# Patient Record
Sex: FEMALE | Race: WHITE | Employment: OTHER | URBAN - METROPOLITAN AREA
[De-identification: names, ages, dates, MRNs, and addresses within clinical notes are randomized per-mention and may not be internally consistent; named-entity substitution may affect disease eponyms.]

---

## 2017-05-22 ENCOUNTER — HOSPITAL ENCOUNTER (OUTPATIENT)
Dept: LAB | Age: 27
Discharge: HOME | End: 2017-05-22
Payer: OTHER GOVERNMENT

## 2017-05-22 ENCOUNTER — OFFICE VISIT (OUTPATIENT)
Dept: OBGYN CLINIC | Age: 27
End: 2017-05-22

## 2017-05-22 VITALS
WEIGHT: 140 LBS | HEIGHT: 64 IN | SYSTOLIC BLOOD PRESSURE: 103 MMHG | BODY MASS INDEX: 23.9 KG/M2 | DIASTOLIC BLOOD PRESSURE: 66 MMHG | HEART RATE: 69 BPM

## 2017-05-22 DIAGNOSIS — Z01.419 WELL WOMAN EXAM WITH ROUTINE GYNECOLOGICAL EXAM: Primary | ICD-10-CM

## 2017-05-22 DIAGNOSIS — Z30.09 FAMILY PLANNING EDUCATION, GUIDANCE, AND COUNSELING: ICD-10-CM

## 2017-05-22 PROCEDURE — 88175 CYTOPATH C/V AUTO FLUID REDO: CPT | Performed by: OBSTETRICS & GYNECOLOGY

## 2017-05-22 RX ORDER — ACETAMINOPHEN AND CODEINE PHOSPHATE 120; 12 MG/5ML; MG/5ML
1 SOLUTION ORAL DAILY
Qty: 1 PACKAGE | Refills: 11 | Status: SHIPPED | OUTPATIENT
Start: 2017-05-22

## 2017-05-22 NOTE — PROGRESS NOTES
Subjective:   28 y.o. female for Well Woman Check. She has  No GYN complaints, but states her 3year old is nursing. She is concerned about contraception and does not desire conception at this point. Her  is currently deployed. Patient's last menstrual period was 02/01/2017 (approximate). Social History: not sexually active. Pertinent past medical hstory: no history of HTN, DVT, CAD, DM, liver disease, migraines or smoking. Patient Active Problem List   Diagnosis Code    Migraine without aura and without status migrainosus, not intractable G43.009    Varicose veins during pregnancy, antepartum O22.00    Palpitations R00.2    Rh negative status during pregnancy in third trimester O09.893     Current Outpatient Prescriptions   Medication Sig Dispense Refill    norethindrone (MICRONOR) 0.35 mg tab Take 1 Tab by mouth daily. Indications: Pregnancy Contraception 1 Package 11    ibuprofen (MOTRIN) 800 mg tablet Take 1 Tab by mouth every eight (8) hours as needed. Indications: PAIN 30 Tab 0    pnv w/o calcium-iron fum-fa 27-1 mg tab Take  by mouth. No Known Allergies  Past Medical History:   Diagnosis Date    15 weeks gestation of pregnancy 11/23/2015    Migraine without aura and without status migrainosus, not intractable 11/23/2015    Rh negative status during pregnancy in third trimester 3/23/2016    Supervision of other normal pregnancy, antepartum 4/6/2016    Varicose veins during pregnancy, antepartum     on lower extremities     Past Surgical History:   Procedure Laterality Date    HX ORTHOPAEDIC  age 10    cyst removed left middle finger    HX TONSILLECTOMY  age 10     Family History   Problem Relation Age of Onset    No Known Problems Mother     Hypertension Father     No Known Problems Maternal Grandmother     No Known Problems Maternal Grandfather         ROS:  Feeling well. No dyspnea or chest pain on exertion.   No abdominal pain, change in bowel habits, black or bloody stools. No urinary tract symptoms. GYN ROS: no breast pain or new or enlarging lumps on self exam, she complains of irregular menses- has had 3 cycles total in 3 years. . No neurological complaints. Objective:     Visit Vitals    /66    Pulse 69    Ht 5' 4\" (1.626 m)    Wt 140 lb (63.5 kg)    LMP 02/01/2017 (Approximate)    Breastfeeding Yes    BMI 24.03 kg/m2     The patient appears well, alert, oriented x 3, in no distress. ENT normal.  Neck supple. No adenopathy or thyromegaly. HO. Lungs are clear, good air entry, no wheezes, rhonchi or rales. S1 and S2 normal, no murmurs, regular rate and rhythm. Abdomen soft without tenderness, guarding, mass or organomegaly. Extremities show no edema, normal peripheral pulses. Neurological is normal, no focal findings. BREAST EXAM: breasts appear normal, no suspicious masses, no skin or nipple changes or axillary nodes    PELVIC EXAM: normal external genitalia, vulva, vagina, cervix, uterus and adnexa    Assessment/Plan:   well woman- breastfeeding. Counseled about all contraceptive options. no contraindication to begin use of oral contraceptives  pap smear  counseled on breast self exam, use and side effects of OCP's and family planning choices  return annually or prn    ICD-10-CM ICD-9-CM    1. Well woman exam with routine gynecological exam Z01.419 V72.31 PAP IG, RFX HPV ASCU, 93&22,70([id number])   2. Family planning education, guidance, and counseling Z30.09 V25.09 norethindrone (MICRONOR) 0.35 mg tab   .

## 2017-05-22 NOTE — PATIENT INSTRUCTIONS
Norethindrone (By mouth)   Norethindrone (nor-ETH-in-drone)  Prevents pregnancy. Also treats menstrual problems and endometriosis. This medicine is commonly called a birth control pill. Brand Name(s): Nargis Fishman Errin, [facility], [place], [facility], Jefry, Nor-QD, Sejal-BE, Norlyroc, Ortho Micronor, Sharobel   There may be other brand names for this medicine. When This Medicine Should Not Be Used: This medicine is not right for everyone. Do not use it if you had an allergic reaction to a progestin drug or if you are pregnant. Do not use it if you have liver disease, liver tumors, or a history of blood clots, stroke, heart attack, or breast cancer. How to Use This Medicine:   Tablet  · Your doctor will tell you how much medicine to use. Do not use more than directed. Different brands of birth control pills have different instructions for when to start. Ask your doctor or pharmacist if you do not understand what day to start taking your brand. · You may take this medicine with food or milk if it upsets your stomach. · Keep your pills in the container you receive from the pharmacy. Take the pills in the order they appear in the container. · Read and follow the patient instructions that come with this medicine. Talk to your doctor or pharmacist if you have any questions. · Take your pill at the same time every day, even during your menstrual period. · Take a dose as soon as you remember. If it is almost time for your next dose, wait until then and take a regular dose. Do not take extra medicine to make up for a missed dose. If you take a pill more than 3 hours late, use another form of birth control for the next 48 hours. · Store the pills in the original package, away from heat, moisture, and direct light. Drugs and Foods to Avoid:   Ask your doctor or pharmacist before using any other medicine, including over-the-counter medicines, vitamins, and herbal products.   · Some foods and medicines can affect how birth control pills work. Tell your doctor if you are also taking any of the following:  ¨ Bromocriptine, rifampin, Nakita's wort, bosentan, griseofulvin  ¨ Antibiotic  ¨ Seizure medicine, including phenytoin, carbamazepine, felbamate, topiramate  ¨ Protease inhibitor that treats HIV/AIDS  ¨ Barbiturate (used to treat seizures, anxiety, or insomnia)  Warnings While Using This Medicine:   · Tell your doctor right away if you think you have become pregnant. If you miss two periods in a row, call your doctor for a pregnancy test before you take any more pills. · Tell your doctor if you are breastfeeding or if you have kidney disease, lupus, high blood pressure, high cholesterol, epilepsy, asthma, migraine headaches, diabetes, or a history of depression. · This medicine may cause the following problems:  ¨ Ectopic (tubal) pregnancy  ¨ Ovarian cysts that might twist or break  ¨ Possible risk of breast cancer  ¨ Benign liver tumor  ¨ Blood clots, which may lead to stroke or heart attack  · You might have spotting or irregular bleeding when you first start to use this medicine. You might have unplanned bleeding if you miss a dose or are late taking it. Call your doctor if you think there is a problem, such as if you have heavy bleeding. · This medicine will not protect you from HIV/AIDS or other sexually transmitted diseases. · Use a second form of birth control during the first 3 weeks to make sure you are protected from pregnancy. · Smoking increases your risk of heart attack, stroke, or blood clot while using this medicine. Talk to your doctor about these risks. · Tell any doctor or dentist who treats you that you are using this medicine. This medicine may affect certain medical test results. · Keep all medicine out of the reach of children. Never share your medicine with anyone.   Possible Side Effects While Using This Medicine:   Call your doctor right away if you notice any of these side effects:  · Allergic reaction: Itching or hives, swelling in your face or hands, swelling or tingling in your mouth or throat, chest tightness, trouble breathing  · Chest pain, trouble breathing, or coughing up blood  · Numbness or weakness on one side of your body, sudden or severe headache, problems with vision, speech, or walking  · Pain in your lower abdomen  · Severe headache, sensitivity to light or sound, nausea or vomiting  · Trouble seeing, double vision, or other eye problems  · Yellow skin or eyes  If you notice these less serious side effects, talk with your doctor:   · Breast tenderness or swelling  · Headache  · Light spotting or bleeding between periods  · Nausea  If you notice other side effects that you think are caused by this medicine, tell your doctor. Call your doctor for medical advice about side effects. You may report side effects to FDA at 0-135-FDA-7488  © 2017 [facility] Information is for End User's use only and may not be sold, redistributed or otherwise used for commercial purposes. The above information is an  only. It is not intended as medical advice for individual conditions or treatments. Talk to your doctor, nurse or pharmacist before following any medical regimen to see if it is safe and effective for you.

## 2017-05-22 NOTE — MR AVS SNAPSHOT
Visit Information Date & Time Provider Department Dept. Phone Encounter #  
 5[facility]22/2017  1[phone] [id number] Upcoming Health Maintenance Date Due  
 PAP AKA CERVICAL CYTOLOGY 2/15/2015 INFLUENZA AGE 9 TO ADULT 8/1/2017 Allergies as of 5/22/2017  Review Complete On: 5/22/2017 By: Arabella Joseph MD  
 No Known Allergies Current Immunizations  Reviewed on 5/22/2016 Name Date Influenza Vaccine 10/26/2015 12:10 PM  
 Rho(D) Immune Globulin - IM 5/22/2016  3:58 AM  
 Tdap 3/9/2016 10:00 AM  
  
 Not reviewed this visit You Were Diagnosed With   
  
 Codes Comments Well woman exam with routine gynecological exam    -  Primary ICD-10-CM: P05.170 ICD-9-CM: V72.31 Family planning education, guidance, and counseling     ICD-10-CM: Z30.09 
ICD-9-CM: V25.09 Vitals BP Pulse Height(growth percentile) Weight(growth percentile) LMP Breastfeeding? 103/66 69 5' 4\" (1.626 m) 140 lb (63.5 kg) 02/01/2017 (Approximate) Yes  
 BMI OB Status Smoking Status 24.03 kg/m2 Postpartum Former Smoker BMI and BSA Data Body Mass Index Body Surface Area 24.03 kg/m 2 1.69 m 2 Preferred Pharmacy Pharmacy Name Phone [facility] [address], [address] [place] [address] [phone] Your Updated Medication List  
  
   
This list is accurate as of: 5/22/17  1:57 PM.  Always use your most recent med list.  
  
  
  
  
 ibuprofen 800 mg tablet Commonly known as:  MOTRIN Take 1 Tab by mouth every eight (8) hours as needed. Indications: PAIN  
  
 pnv w/o calcium-iron fum-fa 27-1 mg Tab Take  by mouth. Introducing \[facility]\"" & HEALTH SERVICES! Dear Early Romaine: 
Thank you for requesting a [employer] account. Our records indicate that you already have an active [employer] account. You can access your account anytime at [email or web address] Did you know that you can access your hospital and ER discharge instructions at any time in [employer]? You can also review all of your test results from your hospital stay or ER visit. Additional Information If you have questions, please visit the Frequently Asked Questions section of the [employer] website at [email or web address] Remember, [employer] is NOT to be used for urgent needs. For medical emergencies, dial 911. Now available from your iPhone and Android! Please provide this summary of care documentation to your next provider. Your primary care clinician is listed as Edie Heck. If you have any questions after today's visit, please call [phone].

## 2018-06-27 ENCOUNTER — HOSPITAL ENCOUNTER (EMERGENCY)
Age: 28
Discharge: HOME | End: 2018-06-28
Attending: EMERGENCY MEDICINE
Payer: OTHER GOVERNMENT

## 2018-06-27 ENCOUNTER — APPOINTMENT (OUTPATIENT)
Dept: CT IMAGING | Age: 28
End: 2018-06-27
Attending: EMERGENCY MEDICINE
Payer: OTHER GOVERNMENT

## 2018-06-27 DIAGNOSIS — R10.31 ACUTE ABDOMINAL PAIN IN RIGHT LOWER QUADRANT: Primary | ICD-10-CM

## 2018-06-27 LAB
ALBUMIN SERPL-MCNC: 4.1 G/DL (ref 3.4–5)
ALBUMIN/GLOB SERPL: 1.2 {RATIO} (ref 0.8–1.7)
ALP SERPL-CCNC: 40 U/L (ref 45–117)
ALT SERPL-CCNC: 17 U/L (ref 13–56)
ANION GAP SERPL CALC-SCNC: 7 MMOL/L (ref 3–18)
APPEARANCE UR: CLEAR
AST SERPL-CCNC: 13 U/L (ref 15–37)
BASOPHILS # BLD: 0 K/UL (ref 0–0.06)
BASOPHILS NFR BLD: 0 % (ref 0–2)
BILIRUB SERPL-MCNC: 0.6 MG/DL (ref 0.2–1)
BILIRUB UR QL: NEGATIVE
BUN SERPL-MCNC: 11 MG/DL (ref 7–18)
BUN/CREAT SERPL: 17 (ref 12–20)
CALCIUM SERPL-MCNC: 9.8 MG/DL (ref 8.5–10.1)
CHLORIDE SERPL-SCNC: 107 MMOL/L (ref 100–108)
CO2 SERPL-SCNC: 27 MMOL/L (ref 21–32)
COLOR UR: YELLOW
CREAT SERPL-MCNC: 0.64 MG/DL (ref 0.6–1.3)
DIFFERENTIAL METHOD BLD: ABNORMAL
EOSINOPHIL # BLD: 0 K/UL (ref 0–0.4)
EOSINOPHIL NFR BLD: 0 % (ref 0–5)
ERYTHROCYTE [DISTWIDTH] IN BLOOD BY AUTOMATED COUNT: 12.5 % (ref 11.6–14.5)
GLOBULIN SER CALC-MCNC: 3.5 G/DL (ref 2–4)
GLUCOSE SERPL-MCNC: 98 MG/DL (ref 74–99)
GLUCOSE UR STRIP.AUTO-MCNC: NEGATIVE MG/DL
HCG UR QL: NEGATIVE
HCT VFR BLD AUTO: 40 % (ref 35–45)
HGB BLD-MCNC: 14 G/DL (ref 12–16)
HGB UR QL STRIP: NEGATIVE
KETONES UR QL STRIP.AUTO: NEGATIVE MG/DL
LEUKOCYTE ESTERASE UR QL STRIP.AUTO: NEGATIVE
LYMPHOCYTES # BLD: 2.8 K/UL (ref 0.9–3.6)
LYMPHOCYTES NFR BLD: 20 % (ref 21–52)
MCH RBC QN AUTO: 31.8 PG (ref 24–34)
MCHC RBC AUTO-ENTMCNC: 35 G/DL (ref 31–37)
MCV RBC AUTO: 90.9 FL (ref 74–97)
MONOCYTES # BLD: 1.5 K/UL (ref 0.05–1.2)
MONOCYTES NFR BLD: 11 % (ref 3–10)
NEUTS SEG # BLD: 10.1 K/UL (ref 1.8–8)
NEUTS SEG NFR BLD: 69 % (ref 40–73)
NITRITE UR QL STRIP.AUTO: NEGATIVE
PH UR STRIP: 6.5 [PH] (ref 5–8)
PLATELET # BLD AUTO: 228 K/UL (ref 135–420)
PMV BLD AUTO: 10.6 FL (ref 9.2–11.8)
POTASSIUM SERPL-SCNC: 3.8 MMOL/L (ref 3.5–5.5)
PROT SERPL-MCNC: 7.6 G/DL (ref 6.4–8.2)
PROT UR STRIP-MCNC: NEGATIVE MG/DL
RBC # BLD AUTO: 4.4 M/UL (ref 4.2–5.3)
SODIUM SERPL-SCNC: 141 MMOL/L (ref 136–145)
SP GR UR REFRACTOMETRY: 1.02 (ref 1–1.03)
UROBILINOGEN UR QL STRIP.AUTO: 0.2 EU/DL (ref 0.2–1)
WBC # BLD AUTO: 14.5 K/UL (ref 4.6–13.2)

## 2018-06-27 PROCEDURE — 81003 URINALYSIS AUTO W/O SCOPE: CPT | Performed by: EMERGENCY MEDICINE

## 2018-06-27 PROCEDURE — 80053 COMPREHEN METABOLIC PANEL: CPT | Performed by: EMERGENCY MEDICINE

## 2018-06-27 PROCEDURE — 74011250636 HC RX REV CODE- 250/636: Performed by: EMERGENCY MEDICINE

## 2018-06-27 PROCEDURE — 74177 CT ABD & PELVIS W/CONTRAST: CPT

## 2018-06-27 PROCEDURE — 81025 URINE PREGNANCY TEST: CPT | Performed by: EMERGENCY MEDICINE

## 2018-06-27 PROCEDURE — 74011636320 HC RX REV CODE- 636/320: Performed by: EMERGENCY MEDICINE

## 2018-06-27 PROCEDURE — 99283 EMERGENCY DEPT VISIT LOW MDM: CPT

## 2018-06-27 PROCEDURE — 85025 COMPLETE CBC W/AUTO DIFF WBC: CPT | Performed by: EMERGENCY MEDICINE

## 2018-06-27 PROCEDURE — 96374 THER/PROPH/DIAG INJ IV PUSH: CPT

## 2018-06-27 RX ORDER — KETOROLAC TROMETHAMINE 30 MG/ML
30 INJECTION, SOLUTION INTRAMUSCULAR; INTRAVENOUS
Status: COMPLETED | OUTPATIENT
Start: 2018-06-27 | End: 2018-06-27

## 2018-06-27 RX ADMIN — IOPAMIDOL 93 ML: 612 INJECTION, SOLUTION INTRAVENOUS at 23:10

## 2018-06-27 RX ADMIN — KETOROLAC TROMETHAMINE 30 MG: 30 INJECTION, SOLUTION INTRAMUSCULAR at 23:30

## 2018-06-28 ENCOUNTER — HOSPITAL ENCOUNTER (EMERGENCY)
Age: 28
Discharge: HOME | End: 2018-06-28
Attending: EMERGENCY MEDICINE | Admitting: SPECIALIST
Payer: OTHER GOVERNMENT

## 2018-06-28 VITALS
SYSTOLIC BLOOD PRESSURE: 106 MMHG | OXYGEN SATURATION: 100 % | HEART RATE: 74 BPM | BODY MASS INDEX: 24.24 KG/M2 | TEMPERATURE: 98.5 F | RESPIRATION RATE: 16 BRPM | WEIGHT: 142 LBS | DIASTOLIC BLOOD PRESSURE: 60 MMHG | HEIGHT: 64 IN

## 2018-06-28 VITALS
SYSTOLIC BLOOD PRESSURE: 115 MMHG | OXYGEN SATURATION: 100 % | TEMPERATURE: 98.3 F | HEART RATE: 89 BPM | DIASTOLIC BLOOD PRESSURE: 58 MMHG | RESPIRATION RATE: 12 BRPM

## 2018-06-28 DIAGNOSIS — R10.31 ABDOMINAL PAIN, RIGHT LOWER QUADRANT: Primary | ICD-10-CM

## 2018-06-28 LAB
ALBUMIN SERPL-MCNC: 4.3 G/DL (ref 3.4–5)
ALBUMIN/GLOB SERPL: 1.3 {RATIO} (ref 0.8–1.7)
ALP SERPL-CCNC: 42 U/L (ref 45–117)
ALT SERPL-CCNC: 18 U/L (ref 13–56)
ANION GAP SERPL CALC-SCNC: 8 MMOL/L (ref 3–18)
AST SERPL-CCNC: 14 U/L (ref 15–37)
BASOPHILS # BLD: 0 K/UL (ref 0–0.06)
BASOPHILS NFR BLD: 0 % (ref 0–2)
BILIRUB SERPL-MCNC: 0.9 MG/DL (ref 0.2–1)
BUN SERPL-MCNC: 11 MG/DL (ref 7–18)
BUN/CREAT SERPL: 17 (ref 12–20)
CALCIUM SERPL-MCNC: 9.3 MG/DL (ref 8.5–10.1)
CHLORIDE SERPL-SCNC: 107 MMOL/L (ref 100–108)
CO2 SERPL-SCNC: 25 MMOL/L (ref 21–32)
CREAT SERPL-MCNC: 0.63 MG/DL (ref 0.6–1.3)
DIFFERENTIAL METHOD BLD: ABNORMAL
EOSINOPHIL # BLD: 0 K/UL (ref 0–0.4)
EOSINOPHIL NFR BLD: 1 % (ref 0–5)
ERYTHROCYTE [DISTWIDTH] IN BLOOD BY AUTOMATED COUNT: 12.6 % (ref 11.6–14.5)
GLOBULIN SER CALC-MCNC: 3.2 G/DL (ref 2–4)
GLUCOSE SERPL-MCNC: 84 MG/DL (ref 74–99)
HCT VFR BLD AUTO: 39.2 % (ref 35–45)
HGB BLD-MCNC: 13.8 G/DL (ref 12–16)
LYMPHOCYTES # BLD: 2.9 K/UL (ref 0.9–3.6)
LYMPHOCYTES NFR BLD: 38 % (ref 21–52)
MCH RBC QN AUTO: 31.8 PG (ref 24–34)
MCHC RBC AUTO-ENTMCNC: 35.2 G/DL (ref 31–37)
MCV RBC AUTO: 90.3 FL (ref 74–97)
MONOCYTES # BLD: 0.9 K/UL (ref 0.05–1.2)
MONOCYTES NFR BLD: 11 % (ref 3–10)
NEUTS SEG # BLD: 3.8 K/UL (ref 1.8–8)
NEUTS SEG NFR BLD: 50 % (ref 40–73)
PLATELET # BLD AUTO: 205 K/UL (ref 135–420)
PMV BLD AUTO: 10.3 FL (ref 9.2–11.8)
POTASSIUM SERPL-SCNC: 4 MMOL/L (ref 3.5–5.5)
PROT SERPL-MCNC: 7.5 G/DL (ref 6.4–8.2)
RBC # BLD AUTO: 4.34 M/UL (ref 4.2–5.3)
SODIUM SERPL-SCNC: 140 MMOL/L (ref 136–145)
WBC # BLD AUTO: 7.7 K/UL (ref 4.6–13.2)

## 2018-06-28 PROCEDURE — 74011250636 HC RX REV CODE- 250/636: Performed by: EMERGENCY MEDICINE

## 2018-06-28 PROCEDURE — 99283 EMERGENCY DEPT VISIT LOW MDM: CPT

## 2018-06-28 PROCEDURE — 74011000258 HC RX REV CODE- 258: Performed by: EMERGENCY MEDICINE

## 2018-06-28 PROCEDURE — 96365 THER/PROPH/DIAG IV INF INIT: CPT

## 2018-06-28 PROCEDURE — 74011250636 HC RX REV CODE- 250/636

## 2018-06-28 PROCEDURE — 80053 COMPREHEN METABOLIC PANEL: CPT | Performed by: EMERGENCY MEDICINE

## 2018-06-28 PROCEDURE — 85025 COMPLETE CBC W/AUTO DIFF WBC: CPT | Performed by: EMERGENCY MEDICINE

## 2018-06-28 RX ORDER — ACETAMINOPHEN 500 MG
1000 TABLET ORAL
Qty: 30 TAB | Refills: 0 | Status: SHIPPED | OUTPATIENT
Start: 2018-06-28

## 2018-06-28 RX ORDER — IBUPROFEN 800 MG/1
800 TABLET ORAL
Qty: 30 TAB | Refills: 0 | Status: SHIPPED | OUTPATIENT
Start: 2018-06-28

## 2018-06-28 RX ADMIN — PIPERACILLIN SODIUM,TAZOBACTAM SODIUM 3.38 G: 3; .375 INJECTION, POWDER, FOR SOLUTION INTRAVENOUS at 18:59

## 2018-06-28 NOTE — ED PROVIDER NOTES
HPI Comments: Jessie Kevin is a 33 y.o. Female with c/o rlq abd pain that started last night got worse today then better tonight. Sharp, worse with palpation eating. No diarrhea, urinary sx, fever. Nothing taken. No sig abd gyn issues. No vag bleeding. The history is provided by the patient. Past Medical History:   Diagnosis Date    15 weeks gestation of pregnancy 11/23/2015    Migraine without aura and without status migrainosus, not intractable 11/23/2015    Rh negative status during pregnancy in third trimester 3/23/2016    Supervision of other normal pregnancy, antepartum 4/6/2016    Varicose veins during pregnancy, antepartum     on lower extremities       Past Surgical History:   Procedure Laterality Date    HX ORTHOPAEDIC  age 10    cyst removed left middle finger    HX TONSILLECTOMY  age 10         Family History:   Problem Relation Age of Onset    No Known Problems Mother     Hypertension Father     No Known Problems Maternal Grandmother     No Known Problems Maternal Grandfather        Social History     Social History    Marital status:      Spouse name: N/A    Number of children: N/A    Years of education: N/A     Occupational History    Not on file. Social History Main Topics    Smoking status: Former Smoker    Smokeless tobacco: Not on file    Alcohol use No    Drug use: No      Comment: denies    Sexual activity: Yes     Partners: Male     Birth control/ protection: None     Other Topics Concern    Not on file     Social History Narrative         ALLERGIES: Review of patient's allergies indicates no known allergies. Review of Systems   Constitutional: Positive for appetite change. Negative for fever. HENT: Negative for sore throat and trouble swallowing. Eyes: Negative for visual disturbance. Respiratory: Negative for cough and shortness of breath. Cardiovascular: Negative for chest pain. Gastrointestinal: Positive for abdominal pain.  Negative for blood in stool. Endocrine: Negative for polyuria. Genitourinary: Negative for difficulty urinating and hematuria. Musculoskeletal: Negative for gait problem. Skin: Negative for rash. Allergic/Immunologic: Negative for immunocompromised state. Neurological: Negative for syncope. Psychiatric/Behavioral: Positive for sleep disturbance. Vitals:    06/27/18 2053 06/27/18 2110 06/28/18 0011   BP: 118/63 114/67 115/58   Pulse: 84 71 89   Resp: 16 14 12   Temp: 98.3 °F (36.8 °C)     SpO2: 100% 100%             Physical Exam   Constitutional: She is oriented to person, place, and time. She appears well-developed and well-nourished. Non-toxic appearance. She does not have a sickly appearance. She does not appear ill. No distress. HENT:   Head: Normocephalic and atraumatic. Right Ear: External ear normal.   Left Ear: External ear normal.   Nose: Nose normal.   Mouth/Throat: Uvula is midline, oropharynx is clear and moist and mucous membranes are normal.   Eyes: Conjunctivae are normal. No scleral icterus. Neck: Neck supple. Cardiovascular: Normal rate, regular rhythm, normal heart sounds and intact distal pulses. Pulmonary/Chest: Effort normal and breath sounds normal.   Abdominal: Soft. Normal appearance. There is no hepatosplenomegaly. There is tenderness. There is guarding and tenderness at McBurney's point. There is no rebound, no CVA tenderness and negative Dodd's sign. Musculoskeletal: She exhibits no edema. Neurological: She is alert and oriented to person, place, and time. Gait normal.   Skin: Skin is warm and dry. She is not diaphoretic. Psychiatric: Her behavior is normal.   Nursing note and vitals reviewed.        [facility]      ED Course       Procedures  Vitals:  Patient Vitals for the past 12 hrs:   Temp Pulse Resp BP SpO2   06/28/18 0011 - 89 12 115/58 -   06/27/18 2110 - 71 14 114/67 100 %   06/27/18 2053 98.3 °F (36.8 °C) 84 16 118/63 100 %         Medications ordered: Medications   ketorolac (TORADOL) injection 30 mg (30 mg IntraVENous Given 6/27/18 7810)   iopamidol (ISOVUE 300) 61 % contrast injection 100 mL (93 mL IntraVENous Given 6/27/18 2310)         Lab findings:  Recent Results (from the past 12 hour(s))   URINALYSIS W/ RFLX MICROSCOPIC    Collection Time: 06/27/18  9:03 PM   Result Value Ref Range    Color YELLOW      Appearance CLEAR      Specific gravity 1.022 1.005 - 1.030      pH (UA) 6.5 5.0 - 8.0      Protein NEGATIVE  NEG mg/dL    Glucose NEGATIVE  NEG mg/dL    Ketone NEGATIVE  NEG mg/dL    Bilirubin NEGATIVE  NEG      Blood NEGATIVE  NEG      Urobilinogen 0.2 0.2 - 1.0 EU/dL    Nitrites NEGATIVE  NEG      Leukocyte Esterase NEGATIVE  NEG     HCG URINE, QL    Collection Time: 06/27/18  9:03 PM   Result Value Ref Range    HCG urine, QL NEGATIVE  NEG     CBC WITH AUTOMATED DIFF    Collection Time: 06/27/18  9:04 PM   Result Value Ref Range    WBC 14.5 (H) 4.6 - 13.2 K/uL    RBC 4.40 4.20 - 5.30 M/uL    HGB 14.0 12.0 - 16.0 g/dL    HCT 40.0 35.0 - 45.0 %    MCV 90.9 74.0 - 97.0 FL    MCH 31.8 24.0 - 34.0 PG    MCHC 35.0 31.0 - 37.0 g/dL    RDW 12.5 11.6 - 14.5 %    PLATELET 832 288 - 537 K/uL    MPV 10.6 9.2 - 11.8 FL    NEUTROPHILS 69 40 - 73 %    LYMPHOCYTES 20 (L) 21 - 52 %    MONOCYTES 11 (H) 3 - 10 %    EOSINOPHILS 0 0 - 5 %    BASOPHILS 0 0 - 2 %    ABS. NEUTROPHILS 10.1 (H) 1.8 - 8.0 K/UL    ABS. LYMPHOCYTES 2.8 0.9 - 3.6 K/UL    ABS. MONOCYTES 1.5 (H) 0.05 - 1.2 K/UL    ABS. EOSINOPHILS 0.0 0.0 - 0.4 K/UL    ABS.  BASOPHILS 0.0 0.0 - 0.06 K/UL    DF AUTOMATED     METABOLIC PANEL, COMPREHENSIVE    Collection Time: 06/27/18  9:04 PM   Result Value Ref Range    Sodium 141 136 - 145 mmol/L    Potassium 3.8 3.5 - 5.5 mmol/L    Chloride 107 100 - 108 mmol/L    CO2 27 21 - 32 mmol/L    Anion gap 7 3.0 - 18 mmol/L    Glucose 98 74 - 99 mg/dL    BUN 11 7.0 - 18 MG/DL    Creatinine 0.64 0.6 - 1.3 MG/DL    BUN/Creatinine ratio 17 12 - 20      GFR est AA >60 >60 ml/min/1.73m2    GFR est non-AA >60 >60 ml/min/1.73m2    Calcium 9.8 8.5 - 10.1 MG/DL    Bilirubin, total 0.6 0.2 - 1.0 MG/DL    ALT (SGPT) 17 13 - 56 U/L    AST (SGOT) 13 (L) 15 - 37 U/L    Alk. phosphatase 40 (L) 45 - 117 U/L    Protein, total 7.6 6.4 - 8.2 g/dL    Albumin 4.1 3.4 - 5.0 g/dL    Globulin 3.5 2.0 - 4.0 g/dL    A-G Ratio 1.2 0.8 - 1.7         EKG interpretation by ED Physician:      X-Ray, CT or other radiology findings or impressions:  CT ABD PELV W CONT    (Results Pending)   appendix not clearly visualized. No sec stranding or other sigs of appendicits  Increased stool transverse colon    Progress notes, Consult notes or additional Procedure notes:   Given location of stool likley etiology of pain in upper abd. Doubt acute appy need for consult admission  I have discussed with patient and/or family/sig other the results, interpretation of any imaging if performed, suspected diagnosis and treatment plan to include instructions regarding the diagnoses listed to which understanding was expressed with all questions answered      Reevaluation of patient:   stable    Disposition:  Diagnosis:   1. Acute abdominal pain in right lower quadrant        Disposition: home      Follow-up Information     Follow up With Details Comments Contact [facility] EMERGENCY DEPT  If symptoms worsen [address] [place] [address]  [phone]            Patient's Medications   Start Taking    ACETAMINOPHEN (TYLENOL EXTRA STRENGTH) 500 MG TABLET    Take 2 Tabs by mouth every six (6) hours as needed for Pain. Continue Taking    NORETHINDRONE (MICRONOR) 0.35 MG TAB    Take 1 Tab by mouth daily. Indications: Pregnancy Contraception    PNV W/O CALCIUM-IRON FUM-FA 27-1 MG TAB    Take  by mouth. These Medications have changed    Modified Medication Previous Medication    IBUPROFEN (MOTRIN) 800 MG TABLET ibuprofen (MOTRIN) 800 mg tablet       Take 1 Tab by mouth every eight (8) hours as needed.  Indications: Pain Take 1 Tab by mouth every eight (8) hours as needed.  Indications: PAIN   Stop Taking    No medications on file

## 2018-06-28 NOTE — CALL BACK NOTE
I called patient to discuss findings of possible appendicitis per attending read this morning. I explained that she needs to come back to ED for repeat work up as she may need surgery consult. She will come as soon as she can and understands risks of not. States she is feeling better and agrees with plan to return.     Jania Joshi PA-C 10:06 AM

## 2018-06-28 NOTE — PROGRESS NOTES
Noted result. Dr. Regina Iglesias and VALDEZ Desai called patient and asked her to return to the ER.  Clau Ndiaye

## 2018-06-28 NOTE — ED NOTES
I performed a brief evaluation, including history and physical, of the patient here in triage and I have determined that pt will need further treatment and evaluation from the main side ER physician. I have placed initial orders to help in expediting patients care.      June 28, 2018 at 5:03 PM - VALDEZ Goss

## 2018-06-28 NOTE — ED TRIAGE NOTES
Lower right abdominal pain x 2 days. Also constipated. Took exlax w/ good resul;ts. CT from last night shows appy. Last ate at noon.  Seen yesterday and called to return

## 2018-06-28 NOTE — ED PROVIDER NOTES
EMERGENCY DEPARTMENT HISTORY AND PHYSICAL EXAM    5:10 PM      Date: 6/28/2018  Patient Name: Dawn Ramsey    History of Presenting Illness     Chief Complaint   Patient presents with    Abdominal Pain         History Provided By: Patient    Chief Complaint: Abdominal Pain  Duration:  1 day  Timing:  Worsening  Location: Lower right abdomen  Quality: N/A  Severity: mild  Modifying Factors: worse with walking up stairs  Associated Symptoms: Left flank pain and constipation. Denies vomiting, dysuria, vaginal bleeding, and vaginal discharge. Additional History (Context): Dawn Ramsey is a 33 y.o. female who presents with lower right abdominal pain that began 1 month ago and worsened last night. The patient was informed to come back to the ED to be reevaluated as there was an over read showing appendicitis on her CT from yesterday evening. The patient also reports having some left flank pain. The patient states she was constipated last night and had taken Ex-Lax today and noted some improvement in pain. She stil reports pain when walking up the stairs. She denies vomiting, dysuria, vaginal bleeding, and vaginal discharge. The last time the patient had anything to eat or drink was at 12:00pm today. The patient denies any abdominal surgeries. The patient is not currently on any medications. The patient's last menstrual period was 2 weeks ago. PCP: Maame Zhong, DO    Current Facility-Administered Medications   Medication Dose Route Frequency Provider Last Rate Last Dose    piperacillin-tazobactam (ZOSYN) 3.375 g in 0.9% sodium chloride (MBP/ADV) 100 mL MBP  3.375 g IntraVENous NOW Charmayne Mettle, DO         Current Outpatient Prescriptions   Medication Sig Dispense Refill    ibuprofen (MOTRIN) 800 mg tablet Take 1 Tab by mouth every eight (8) hours as needed. Indications: Pain 30 Tab 0    acetaminophen (TYLENOL EXTRA STRENGTH) 500 mg tablet Take 2 Tabs by mouth every six (6) hours as needed for Pain. 30 Tab 0    norethindrone (MICRONOR) 0.35 mg tab Take 1 Tab by mouth daily. Indications: Pregnancy Contraception 1 Package 11    pnv w/o calcium-iron fum-fa 27-1 mg tab Take  by mouth. Past History     Past Medical History:  Past Medical History:   Diagnosis Date    15 weeks gestation of pregnancy 11/23/2015    Migraine without aura and without status migrainosus, not intractable 11/23/2015    Rh negative status during pregnancy in third trimester 3/23/2016    Supervision of other normal pregnancy, antepartum 4/6/2016    Varicose veins during pregnancy, antepartum     on lower extremities       Past Surgical History:  Past Surgical History:   Procedure Laterality Date    HX ORTHOPAEDIC  age 10    cyst removed left middle finger    HX TONSILLECTOMY  age 10       Family History:  Family History   Problem Relation Age of Onset    No Known Problems Mother     Hypertension Father     No Known Problems Maternal Grandmother     No Known Problems Maternal Grandfather        Social History:  Social History   Substance Use Topics    Smoking status: Former Smoker    Smokeless tobacco: Never Used    Alcohol use No       Allergies:  No Known Allergies      Review of Systems       Review of Systems   Gastrointestinal: Positive for abdominal pain and constipation. Negative for vomiting. Genitourinary: Positive for flank pain. Negative for dysuria, vaginal bleeding and vaginal discharge. All other systems reviewed and are negative. Physical Exam     Visit Vitals    /66 (BP 1 Location: Right arm, BP Patient Position: At rest)    Pulse 90    Temp 98.3 °F (36.8 °C)    Resp 16    Ht 5' 4\" (1.626 m)    Wt 64.4 kg (142 lb)    LMP 06/08/2018    SpO2 100%    BMI 24.37 kg/m2       Physical Exam   Constitutional: She is oriented to person, place, and time. She appears well-developed and well-nourished. HENT:   Head: Normocephalic and atraumatic. Neck: Neck supple. No JVD present. Cardiovascular: Normal rate and regular rhythm. Pulmonary/Chest: Effort normal and breath sounds normal. No respiratory distress. Abdominal: Soft. She exhibits no distension. There is tenderness in the right lower quadrant. There is no rebound and no guarding. Musculoskeletal: She exhibits no edema. Neurological: She is alert and oriented to person, place, and time. Skin: Skin is warm and dry. No erythema. Psychiatric: Judgment normal.         Diagnostic Study Results     Labs -  Recent Results (from the past 12 hour(s))   CBC WITH AUTOMATED DIFF    Collection Time: 06/28/18  5:20 PM   Result Value Ref Range    WBC 7.7 4.6 - 13.2 K/uL    RBC 4.34 4.20 - 5.30 M/uL    HGB 13.8 12.0 - 16.0 g/dL    HCT 39.2 35.0 - 45.0 %    MCV 90.3 74.0 - 97.0 FL    MCH 31.8 24.0 - 34.0 PG    MCHC 35.2 31.0 - 37.0 g/dL    RDW 12.6 11.6 - 14.5 %    PLATELET 709 666 - 044 K/uL    MPV 10.3 9.2 - 11.8 FL    NEUTROPHILS 50 40 - 73 %    LYMPHOCYTES 38 21 - 52 %    MONOCYTES 11 (H) 3 - 10 %    EOSINOPHILS 1 0 - 5 %    BASOPHILS 0 0 - 2 %    ABS. NEUTROPHILS 3.8 1.8 - 8.0 K/UL    ABS. LYMPHOCYTES 2.9 0.9 - 3.6 K/UL    ABS. MONOCYTES 0.9 0.05 - 1.2 K/UL    ABS. EOSINOPHILS 0.0 0.0 - 0.4 K/UL    ABS. BASOPHILS 0.0 0.0 - 0.06 K/UL    DF AUTOMATED     METABOLIC PANEL, COMPREHENSIVE    Collection Time: 06/28/18  5:20 PM   Result Value Ref Range    Sodium 140 136 - 145 mmol/L    Potassium 4.0 3.5 - 5.5 mmol/L    Chloride 107 100 - 108 mmol/L    CO2 25 21 - 32 mmol/L    Anion gap 8 3.0 - 18 mmol/L    Glucose 84 74 - 99 mg/dL    BUN 11 7.0 - 18 MG/DL    Creatinine 0.63 0.6 - 1.3 MG/DL    BUN/Creatinine ratio 17 12 - 20      GFR est AA >60 >60 ml/min/1.73m2    GFR est non-AA >60 >60 ml/min/1.73m2    Calcium 9.3 8.5 - 10.1 MG/DL    Bilirubin, total 0.9 0.2 - 1.0 MG/DL    ALT (SGPT) 18 13 - 56 U/L    AST (SGOT) 14 (L) 15 - 37 U/L    Alk.  phosphatase 42 (L) 45 - 117 U/L    Protein, total 7.5 6.4 - 8.2 g/dL    Albumin 4.3 3.4 - 5.0 g/dL Globulin 3.2 2.0 - 4.0 g/dL    A-G Ratio 1.3 0.8 - 1.7         Radiologic Studies -   No orders to display         Medical Decision Making   I am the first provider for this patient. I reviewed the vital signs, available nursing notes, past medical history, past surgical history, family history and social history. Vital Signs-Reviewed the patient's vital signs. Pulse Oximetry Analysis -  100 on room air (Interpretation) nl       Records Reviewed: Nursing Notes, Old Medical Records and Previous Radiology Studies (Time of Review: 5:10 PM)    ED Course: Progress Notes, Reevaluation, and Consults:    Provider Notes (Medical Decision Making): 32 y/o female presents with RLQ abd pain. CT showing appendicitis. Pt well appearing at this time, no sign of perf. Will repeat labs and consult surgery. 6:08 PM  Consult: Discussed with Dr. Serge Kelly, Gen Surgery, he will consult on pt.       6:10 PM : Pt care transferred to Dr. Jr Rodriguez  ,ED provider. History of patient complaint(s), available diagnostic reports and current treatment plan has been discussed thoroughly. Bedside rounding on patient occured : yes . Intended disposition of patient : TBD  Pending diagnostics reports and/or labs (please list): awaiting surg consult      Diagnosis     Clinical Impression: No diagnosis found. Disposition:      Attestations:  Scribe Attestation     Parveen Stokes acting as a scribe for and in the presence of Juany Hewitt DO      June 28, 2018 at 6:09 PM       Provider Attestation:      I personally performed the services described in the documentation, reviewed the documentation, as recorded by the scribe in my presence, and it accurately and completely records my words and actions.  June 28, 2018 at 6:09 PM - Juany Hewitt DO    _______________________________

## 2018-06-28 NOTE — PROGRESS NOTES
7:09 PM :Pt care assumed from Dr. Mikey Donnelly , ED provider. Pt complaint(s), current treatment plan, progression and available diagnostic results have been discussed thoroughly. Rounding occurred: yes  Intended Disposition: TBD   Pending diagnostic reports and/or labs (please list): seen by Dr. Jc Mendez 1 day ago, has RLQ pain. CT showed acute appendicitis. WC of 14 yesterday, improved today. Has tenderness. Given zosyn. NPO since then. Pending surgery consult.     8:09 PM Dr. Srini Lynne consulted. Pt would like to go home. Patient reevaluated by myself, she is very well-appearing, abdomen is completely benign except for RIGHT lower quadrant where she only has pain on deep palpation. There is no low pelvic pain doubtfully adnexal process. She had used laxatives her pain had improved although slightly better. Evaluated in person by surgery, they had a discussion about risks benefits and alternatives of laparoscopic appendectomy and confirm that she did not want to have this done at this time. She is stable for outpatient management, understanding that she will return here immediately with any concerns whatsoever    Patient's presentation, history, physical exam and laboratory evaluations were reviewed. At this time patient was felt to be stable for outpatient management and follow with primary care/specialist.  Patient was instructed to return to the emergency department with any concerns. Disposition:    Discharged home      Portions of this chart were created with Dragon medical speech to text program.   Unrecognized errors may be present.       Disposition: Discharged    [address] Rolando acting as a scribe for and in the presence of Barbara Paredes MD      June 28, 2018 at 7:09 PM       Provider Attestation:      I personally performed the services described in the documentation, reviewed the documentation, as recorded by the scribe in my presence, and it accurately and completely records my words and actions.  June 28, 2018 at 7:09 PM - Laura France MD

## 2018-06-29 NOTE — CONSULTS
[address]  MR#: [id number]  : 1990  ACCOUNT #: [de-identified]   DATE OF SERVICE: 2018    HISTORY OF PRESENT ILLNESS:  The patient is a 24-year-old female who came to the emergency room late last night because of abdominal pain. She states that the pain was noticeable when she first woke up yesterday morning. The pain seemed to be in the mid to upper abdomen, and as the day progressed, the pain got worse. It seemed to move a little bit lower in the abdomen. She did have an episode of nausea and when the pain was persisting and fairly severe and associated with the nausea, she decided to come to the emergency room. At that time, the patient did not have a fever. She denied any dysuria. Her last menstrual period was just about 2-3 weeks ago and was normal.  She was not having fever or chills. A CT scan of the abdomen was done. That reading initially came back as no evidence of appendicitis. Patient apparently at that time was noted by the emergency room physician to have tenderness in the right lower quadrant with some guarding and tenderness at the McBurney point. A reevaluation showed that she was stable and apparently perhaps a little bit better, so she was discharged. Her white count when she came in yesterday was 14,500 with no shift. The patient was told that her CT scan did show a fair amount of stool in the colon and occasionally she has had constipation, so she took some Ex-Lax today and actually had several bowel movements. However, earlier today an overread by the radiologist stated that the CT scan did show what looked like perhaps early appendicitis with some slight thickening of the wall of the appendix and questionable stranding, but no evidence of any rupture, no free air. So the patient was told to come back to the emergency room. Presently, the patient states she has no nausea.   She actually ate some food and drank some water at lunch. She has had no dysuria, no fever or chills. She states that her pain is much better than it had been. She notices a little bit of discomfort when she climbs stairs. The pain is now just located in the right lower quadrant, but mostly only when the area is palpated. Overall, the patient does feel much better. The repeat white count this evening shows that her white count is 7700 with no left shift. Her urinalysis yesterday failed to show any evidence of a UTI. As mentioned, I personally reviewed the CT scan and discussed it with the radiologist.  The diameter of the appendix is 10 mm, sitting next to the uterus with no perforation, no abscess. No significant fluid. ALLERGIES:  NONE. MEDICATIONS AT HOME:  None except vitamins. PAST MEDICAL HISTORY:  No history of diabetes, hypertension, heart disease, stroke, kidney disease or cancer. PAST SURGICAL HISTORY:  Tonsillectomy. REVIEW OF SYSTEMS:  Patient denies any specific cardiac problems except for occasional palpitations. She denies any chronic respiratory, other gastrointestinal or genitourinary symptoms. No neurologic, hematologic or endocrinologic problems. She is  2. The patient's hCG was negative. I have nothing to add to the patient's social or family history. Her  is present with her. PHYSICAL EXAMINATION:    GENERAL:  The patient is lying on the stretcher and when I asked her to sit up, she sits without any evidence of any grimacing or discomfort. She is alert and cooperative. VITAL SIGNS:  She is afebrile, and her pulse is 74 with a blood pressure of 106/60. HEENT:  Normal.  NECK:  Normal without adenopathy. LUNGS:  Clear to auscultation and percussion. HEART:  Regular. No murmur or extra heart sounds heard. ABDOMEN:  Flat, soft, with no organomegaly or masses. She has normally active bowel sounds.   The patient has very mild tenderness in the right lower quadrant with fairly deep palpation without any guarding or rebound. She has slight tenderness or soreness when I move her right leg. ASSESSMENT:  Patient with right lower quadrant pain that has improved over the last 36 hours, as has her white count. A CT scan suggested a generous appendix with possible early appendicitis, but clinically the patient certainly has improved and this also after taking a laxative and clearing out the fair amount of stool that was in her colon. At the present time, clinically I doubt that the patient has appendicitis. We talked about her options for treatment, whether to proceed with surgery given the CT findings or continue observation. The patient was in favor of observation, which I am as well. I gave the patient the option of being here in the hospital or going home since she lives very close to the hospital.  She preferred to go home. I instructed her that if she got any increasing pain, fever, nausea, she should come on back to the emergency room. I did talk about the possibility that if this is an early appendicitis antibiotics may help, so we did go ahead and place her on amoxicillin 500 mg 3 times a day. Patient and  were satisfied with this plan, and I feel comfortable that they will come back if her symptoms worsen.       MD PATY Boateng /   D: 06/28/2018 20:21     T: 06/28/2018 22:12  JOB #: [id number]  CC: Raul Luke MD  [address] [facility] [id number]  [address] [facility]

## 2018-06-29 NOTE — DISCHARGE INSTRUCTIONS
Abdominal Pain: Care Instructions  Your Care Instructions    Abdominal pain has many possible causes. Some aren't serious and get better on their own in a few days. Others need more testing and treatment. If your pain continues or gets worse, you need to be rechecked and may need more tests to find out what is wrong. You may need surgery to correct the problem. Don't ignore new symptoms, such as fever, nausea and vomiting, urination problems, pain that gets worse, and dizziness. These may be signs of a more serious problem. Your doctor may have recommended a follow-up visit in the next 8 to 12 hours. If you are not getting better, you may need more tests or treatment. The doctor has checked you carefully, but problems can develop later. If you notice any problems or new symptoms, get medical treatment right away. Follow-up care is a key part of your treatment and safety. Be sure to make and go to all appointments, and call your doctor if you are having problems. It's also a good idea to know your test results and keep a list of the medicines you take. How can you care for yourself at home? · Rest until you feel better. · To prevent dehydration, drink plenty of fluids, enough so that your urine is light yellow or clear like water. Choose water and other caffeine-free clear liquids until you feel better. If you have kidney, heart, or liver disease and have to limit fluids, talk with your doctor before you increase the amount of fluids you drink. · If your stomach is upset, eat mild foods, such as rice, dry toast or crackers, bananas, and applesauce. Try eating several small meals instead of two or three large ones. · Wait until 48 hours after all symptoms have gone away before you have spicy foods, alcohol, and drinks that contain caffeine. · Do not eat foods that are high in fat. · Avoid anti-inflammatory medicines such as aspirin, ibuprofen (Advil, Motrin), and naproxen (Aleve).  These can cause stomach upset. Talk to your doctor if you take daily aspirin for another health problem. When should you call for help? Call 911 anytime you think you may need emergency care. For example, call if:  ? · You passed out (lost consciousness). ? · You pass maroon or very bloody stools. ? · You vomit blood or what looks like coffee grounds. ? · You have new, severe belly pain. ?Call your doctor now or seek immediate medical care if:  ? · Your pain gets worse, especially if it becomes focused in one area of your belly. ? · You have a new or higher fever. ? · Your stools are black and look like tar, or they have streaks of blood. ? · You have unexpected vaginal bleeding. ? · You have symptoms of a urinary tract infection. These may include:  ¨ Pain when you urinate. ¨ Urinating more often than usual.  ¨ Blood in your urine. ? · You are dizzy or lightheaded, or you feel like you may faint. ? Watch closely for changes in your health, and be sure to contact your doctor if:  ? · You are not getting better after 1 day (24 hours). Where can you learn more? Go to http://phillip-armin.info/. Enter O482 in the search box to learn more about \"Abdominal Pain: Care Instructions. \"  Current as of: March 20, 2017  Content Version: 11.4  © 4887-0436 [employer]. Care instructions adapted under license by [employer] (which disclaims liability or warranty for this information). If you have questions about a medical condition or this instruction, always ask your healthcare professional. [place] [address] any warranty or liability for your use of this information. Possible Appendicitis: Care Instructions  Your Care Instructions    Your doctor thinks you may have appendicitis. This means that your appendix may be infected. The appendix is a small sac that is shaped like a finger. It's attached to your large intestine.   Sometimes it's hard to tell if a person has appendicitis. It is especially hard to tell in children, pregnant women, and older adults. If your doctor thinks it's possible that you have appendicitis, he or she may want to order more tests. Or your doctor may want to wait to see if your symptoms change. Your doctor thinks it's okay for you to go home right now. But you will need to watch for symptoms of appendicitis at home. If your symptoms continue or get worse, it's important to call your doctor or get medical care right away. Appendicitis can get serious very quickly. The main treatment is surgery to remove your appendix. Follow-up care is a key part of your treatment and safety. Be sure to make and go to all appointments, and call your doctor if you are having problems. It's also a good idea to know your test results and keep a list of the medicines you take. How can you care for yourself at home? · Do not eat or drink, unless your doctor says it is okay. If you need surgery, it's best to have an empty stomach. If you're thirsty, you can rinse your mouth with water. Or you can suck on hard candy. · Do not take laxatives. If you have appendicitis, they can make the appendix burst.  · Follow your doctor's instructions about taking medicines. Your doctor may tell you not to take antibiotics or pain pills. These medicines can make it harder to tell if you have appendicitis. · Watch for symptoms of appendicitis. See the When should you call for help section below. It is very important to follow your doctor's instructions about getting treatment if you have these symptoms. When should you call for help? Call your doctor now or seek immediate medical care if:  ? · You have pain that becomes focused on one area of your belly. ? · You have new or worse belly pain. ? · You are vomiting. ? · You have a fever. ? · You cannot pass stools or gas. ? Watch closely for changes in your health, and be sure to contact your doctor if:  ? · You do not get better as expected. Where can you learn more? Go to http://phillip-armin.info/. Enter U493 in the search box to learn more about \"Possible Appendicitis: Care Instructions. \"  Current as of: May 12, 2017  Content Version: 11.4  © 4722-4394 [employer], [employer]. Care instructions adapted under license by [employer] (which disclaims liability or warranty for this information). If you have questions about a medical condition or this instruction, always ask your healthcare professional. [address] any warranty or liability for your use of this information.

## 2018-12-28 ENCOUNTER — DOCUMENTATION ONLY (OUTPATIENT)
Dept: OBGYN CLINIC | Age: 28
End: 2018-12-28

## 2018-12-28 NOTE — PROGRESS NOTES
Pt called with complaint of vaginal itching after using monistat and vagisil. Pt offered an appt 1-2-2019 but declined due to work schedule. Pt advised to apply topical monistat on area and to use scent free soap or plain water and to call when able to make an appt. Pt states she has an appt with PCP on 12.31.18 and will have it checked there. Pt verbal understanding.

## 2019-07-12 ENCOUNTER — OFFICE VISIT (OUTPATIENT)
Dept: OBGYN CLINIC | Age: 29
End: 2019-07-12

## 2019-07-12 ENCOUNTER — HOSPITAL ENCOUNTER (OUTPATIENT)
Dept: LAB | Age: 29
Discharge: HOME | End: 2019-07-12
Payer: OTHER GOVERNMENT

## 2019-07-12 VITALS
HEART RATE: 65 BPM | SYSTOLIC BLOOD PRESSURE: 112 MMHG | BODY MASS INDEX: 27.31 KG/M2 | WEIGHT: 160 LBS | DIASTOLIC BLOOD PRESSURE: 62 MMHG | HEIGHT: 64 IN | RESPIRATION RATE: 18 BRPM

## 2019-07-12 DIAGNOSIS — Z30.09 FAMILY PLANNING: ICD-10-CM

## 2019-07-12 DIAGNOSIS — Z01.419 ENCOUNTER FOR GYNECOLOGICAL EXAMINATION WITHOUT ABNORMAL FINDING: Primary | ICD-10-CM

## 2019-07-12 PROCEDURE — 88175 CYTOPATH C/V AUTO FLUID REDO: CPT

## 2019-07-12 PROCEDURE — 87624 HPV HI-RISK TYP POOLED RSLT: CPT

## 2019-07-12 RX ORDER — BISMUTH SUBSALICYLATE 262 MG
1 TABLET,CHEWABLE ORAL DAILY
COMMUNITY

## 2019-07-12 NOTE — PROGRESS NOTES
Subjective:   26 y.o. female for Well Woman Check. Patient's last menstrual period was 07/11/2019. Social History: single partner, contraception - none. Pertinent past medical hstory: no history of HTN, DVT, CAD, DM, liver disease, migraines or smoking. Current Outpatient Medications   Medication Sig Dispense Refill    multivitamin (ONE A DAY) tablet Take 1 Tab by mouth daily.  ibuprofen (MOTRIN) 800 mg tablet Take 1 Tab by mouth every eight (8) hours as needed. Indications: Pain 30 Tab 0    acetaminophen (TYLENOL EXTRA STRENGTH) 500 mg tablet Take 2 Tabs by mouth every six (6) hours as needed for Pain. 30 Tab 0    norethindrone (MICRONOR) 0.35 mg tab Take 1 Tab by mouth daily. Indications: Pregnancy Contraception 1 Package 11    pnv w/o calcium-iron fum-fa 27-1 mg tab Take  by mouth. No Known Allergies  Past Medical History:   Diagnosis Date    15 weeks gestation of pregnancy 11/23/2015    Migraine without aura and without status migrainosus, not intractable 11/23/2015    Rh negative status during pregnancy in third trimester 3/23/2016    Supervision of other normal pregnancy, antepartum 4/6/2016    Varicose veins during pregnancy, antepartum     on lower extremities     Past Surgical History:   Procedure Laterality Date    HX ORTHOPAEDIC  age 10    cyst removed left middle finger    HX TONSILLECTOMY  age 10     Family History   Problem Relation Age of Onset    No Known Problems Mother     Hypertension Father     No Known Problems Maternal Grandmother     No Known Problems Maternal Grandfather      Social History     Tobacco Use    Smoking status: Former Smoker    Smokeless tobacco: Never Used   Substance Use Topics    Alcohol use: No     Alcohol/week: 0.0 oz        ROS:  Feeling well. No dyspnea or chest pain on exertion. No abdominal pain, change in bowel habits, black or bloody stools. No urinary tract symptoms.  GYN ROS: normal menses, no abnormal bleeding, pelvic pain or discharge, no breast pain or new or enlarging lumps on self exam. No neurological complaints. Objective:     Visit Vitals  /62 (BP 1 Location: Left arm, BP Patient Position: Sitting)   Pulse 65   Resp 18   Ht 5' 4\" (1.626 m)   Wt 160 lb (72.6 kg)   LMP 07/11/2019   Breastfeeding? No   BMI 27.46 kg/m²     The patient appears well, alert, oriented x 3, in no distress. ENT normal.  Neck supple. No adenopathy or thyromegaly. HO. Lungs are clear, good air entry, no wheezes, rhonchi or rales. S1 and S2 normal, no murmurs, regular rate and rhythm. Abdomen soft without tenderness, guarding, mass or organomegaly. Extremities show no edema, normal peripheral pulses. Neurological is normal, no focal findings. BREAST EXAM: breasts appear normal, no suspicious masses, no skin or nipple changes or axillary nodes    PELVIC EXAM: normal external genitalia, vulva, vagina, cervix, uterus and adnexa, PAP: Pap smear done today, HPV test    Assessment/Plan:   well woman  pap smear  return annually or prn  Plan of care discussed. Patient expressed understanding.

## 2019-07-19 RX ORDER — NORETHINDRONE ACETATE AND ETHINYL ESTRADIOL 1MG-20(21)
1 KIT ORAL DAILY
Qty: 90 TAB | Refills: 3 | Status: SHIPPED | OUTPATIENT
Start: 2019-07-19

## 2020-05-27 ENCOUNTER — OFFICE VISIT (OUTPATIENT)
Dept: OBGYN CLINIC | Age: 30
End: 2020-05-27

## 2020-05-27 ENCOUNTER — HOSPITAL ENCOUNTER (OUTPATIENT)
Dept: LAB | Age: 30
Discharge: HOME | End: 2020-05-27
Payer: OTHER GOVERNMENT

## 2020-05-27 VITALS
SYSTOLIC BLOOD PRESSURE: 118 MMHG | HEIGHT: 64 IN | HEART RATE: 72 BPM | WEIGHT: 171 LBS | BODY MASS INDEX: 29.19 KG/M2 | DIASTOLIC BLOOD PRESSURE: 81 MMHG | TEMPERATURE: 97.8 F

## 2020-05-27 DIAGNOSIS — R30.9 URINARY PAIN: ICD-10-CM

## 2020-05-27 DIAGNOSIS — R30.9 URINARY PAIN: Primary | ICD-10-CM

## 2020-05-27 LAB
BILIRUB UR QL STRIP: NEGATIVE
GLUCOSE UR-MCNC: NEGATIVE MG/DL
KETONES P FAST UR STRIP-MCNC: NEGATIVE MG/DL
PH UR STRIP: 6.5 [PH] (ref 4.6–8)
PROT UR QL STRIP: NEGATIVE
SP GR UR STRIP: 1 (ref 1–1.03)
UA UROBILINOGEN AMB POC: NORMAL (ref 0.2–1)
URINALYSIS CLARITY POC: NORMAL
URINALYSIS COLOR POC: YELLOW
URINE BLOOD POC: NORMAL
URINE LEUKOCYTES POC: NEGATIVE
URINE NITRITES POC: NEGATIVE

## 2020-05-27 PROCEDURE — 87086 URINE CULTURE/COLONY COUNT: CPT

## 2020-05-27 NOTE — PROGRESS NOTES
30 y/o  presents to the office for urinary pain. She states she has increased water intake and cranberry pills for the pain. She notes the pain has improved. She also used a UTI strip from her child, which was positive yesterday. She notes she had throbbing in the  Urethra after laying out several days last week. She then had a burning sensation after urination and became more concerned. She denies concern for STD, new partner or sexual practice or new hygiene products. She takes OCPs accurately and has no menstrual concerns. Visit Vitals  /81   Pulse 72   Temp 97.8 °F (36.6 °C) (Tympanic)   Ht 5' 4\" (1.626 m)   Wt 171 lb (77.6 kg)   BMI 29.35 kg/m²     Gen: A&Ox3, NAD  Exam : deferred    U/A:   Results for orders placed or performed in visit on 20   AMB POC URINALYSIS DIP STICK AUTO W/O MICRO     Status: None   Result Value Ref Range Status    Color (UA POC) Yellow  Final    Clarity (UA POC) Cloudy  Final    Glucose (UA POC) Negative Negative Final    Bilirubin (UA POC) Negative Negative Final    Ketones (UA POC) Negative Negative Final    Specific gravity (UA POC) 1.005 1.001 - 1.035 Final    Blood (UA POC) Trace Negative Final    pH (UA POC) 6.5 4.6 - 8.0 Final    Protein (UA POC) Negative Negative Final    Urobilinogen (UA POC) 0.2 mg/dL 0.2 - 1 Final    Nitrites (UA POC) Negative Negative Final    Leukocyte esterase (UA POC) Negative Negative Final       A/P: 30 y/o  with urinary pain. Pain has improved with increased water and cranberry pills. Will send urine for culture. If culture positive, will treat. The plan of care has been discussed with the patient. She has been given the opportunity to ask questions, which seem to have been answered satisfactorily.

## 2020-05-29 LAB
BACTERIA SPEC CULT: ABNORMAL
CC UR VC: ABNORMAL
SERVICE CMNT-IMP: ABNORMAL

## 2023-03-10 ENCOUNTER — TELEPHONE (OUTPATIENT)
Age: 33
End: 2023-03-10

## 2023-03-10 NOTE — TELEPHONE ENCOUNTER
Patient went to [facility] and would like for us to pul the encounter for juan to look it over

## 2023-03-13 ENCOUNTER — OFFICE VISIT (OUTPATIENT)
Age: 33
End: 2023-03-13
Payer: OTHER GOVERNMENT

## 2023-03-13 VITALS
HEART RATE: 84 BPM | SYSTOLIC BLOOD PRESSURE: 114 MMHG | DIASTOLIC BLOOD PRESSURE: 75 MMHG | BODY MASS INDEX: 28 KG/M2 | HEIGHT: 64 IN | WEIGHT: 164 LBS | OXYGEN SATURATION: 99 %

## 2023-03-13 DIAGNOSIS — R00.2 PALPITATIONS: Primary | ICD-10-CM

## 2023-03-13 PROCEDURE — 99204 OFFICE O/P NEW MOD 45 MIN: CPT | Performed by: INTERNAL MEDICINE

## 2023-03-13 RX ORDER — METOPROLOL SUCCINATE 50 MG/1
50 TABLET, EXTENDED RELEASE ORAL DAILY
Qty: 30 TABLET | Refills: 5 | Status: SHIPPED | OUTPATIENT
Start: 2023-03-13

## 2023-03-13 RX ORDER — FOLIC ACID 0.8 MG
500 TABLET ORAL DAILY
COMMUNITY

## 2023-03-13 ASSESSMENT — ANXIETY QUESTIONNAIRES
6. BECOMING EASILY ANNOYED OR IRRITABLE: 0
2. NOT BEING ABLE TO STOP OR CONTROL WORRYING: 0
5. BEING SO RESTLESS THAT IT IS HARD TO SIT STILL: 0
4. TROUBLE RELAXING: 0
3. WORRYING TOO MUCH ABOUT DIFFERENT THINGS: 0
1. FEELING NERVOUS, ANXIOUS, OR ON EDGE: 0
GAD7 TOTAL SCORE: 0
IF YOU CHECKED OFF ANY PROBLEMS ON THIS QUESTIONNAIRE, HOW DIFFICULT HAVE THESE PROBLEMS MADE IT FOR YOU TO DO YOUR WORK, TAKE CARE OF THINGS AT HOME, OR GET ALONG WITH OTHER PEOPLE: NOT DIFFICULT AT ALL
7. FEELING AFRAID AS IF SOMETHING AWFUL MIGHT HAPPEN: 0

## 2023-03-13 ASSESSMENT — PATIENT HEALTH QUESTIONNAIRE - PHQ9
SUM OF ALL RESPONSES TO PHQ9 QUESTIONS 1 & 2: 0
SUM OF ALL RESPONSES TO PHQ QUESTIONS 1-9: 0
1. LITTLE INTEREST OR PLEASURE IN DOING THINGS: 0
2. FEELING DOWN, DEPRESSED OR HOPELESS: 0
SUM OF ALL RESPONSES TO PHQ QUESTIONS 1-9: 0

## 2023-03-13 NOTE — TELEPHONE ENCOUNTER
PCP: Suzy Orellana DO    Last appt: 3/13/23  No future appointments.     Requested Prescriptions     Pending Prescriptions Disp Refills    metoprolol succinate (TOPROL XL) 50 MG extended release tablet 30 tablet 5     Sig: Take 1 tablet by mouth daily

## 2023-04-24 ASSESSMENT — ENCOUNTER SYMPTOMS: SHORTNESS OF BREATH: 0

## 2023-04-24 NOTE — PROGRESS NOTES
Assessment/Plan:       ICD-10-CM    1. Palpitations the patient was previously seen for palpitations in 2016. We will start metoprolol succinate 25 mg nightly. Repeat a 48-hour Holter monitor. Return in 3 weeks. R00.2 Cardiac holter monitor (<= 48 hours)               Subjective:   Reema Tobar is in the office today for cardiac evaluation. She is a 27-year-old woman been experiencing episodic palpitations for many years. In the office today, she reports that over the last month she has had more palpitations. She reports that she does have associated dizziness. She reports it makes her more anxious. If it occurs at night and can go on for hours. Patient's cardiac risk factors are: none    Past Med, Surg, Fam, Soc Hx and Allergies reviewed and updated as indicated      Review of Systems   Constitutional: Negative. Respiratory:  Negative for shortness of breath. Cardiovascular:  Positive for palpitations. Negative for chest pain. Skin: Negative. Neurological:  Positive for dizziness. Objective:   Physical Exam   Constitutional: She appears healthy. Neck: No JVD present. Pulmonary/Chest: Effort normal and breath sounds normal.   Abdominal: Soft. Bowel sounds are normal.   Musculoskeletal:         General: No edema. Cervical back: Normal range of motion. Neurological: She is alert and oriented to person, place, and time. Skin: Skin is warm and dry. 12 lead ECG: 3/9/23 . Sinus rhythm.  Normal ECG

## 2023-04-24 NOTE — PROGRESS NOTES
Author Rodriguez presents today for   Chief Complaint   Patient presents with    New Patient     Re-establish care due to rapid heart beat     Palpitations     Racing/pounding palps that comes and goes, states worse at night     Dizziness     Lightheaded on/off       Author Rodriguez preferred language for health care discussion is english/other. Is someone accompanying this pt? yes    Is the patient using any DME equipment during OV? no    Depression Screening:  Depression: Not at risk    PHQ-2 Score: 0        Learning Assessment:  Who is the primary learner? Patient    What is the preferred language for health care of the primary learner? ENGLISH    How does the primary learner prefer to learn new concepts? DEMONSTRATION    Answered By patient    Relationship to Learner SELF           Pt currently taking Anticoagulant therapy? no    Pt currently taking Antiplatelet therapy ? no      Coordination of Care:  1. Have you been to the ER, urgent care clinic since your last visit? Hospitalized since your last visit? no    2. Have you seen or consulted any other health care providers outside of the [address] [place] [address] since your last visit? Include any pap smears or colon screening.  no

## 2023-04-24 NOTE — PROGRESS NOTES
INITIAL CARDIAC PATIENT EVALUATION    Reason for consult:     Assessment/Plan:     Palpitations, patient seen in 2016 for similar symptoms. Start metoprolol succinate 25 mg nightly    Order 48-hour Holter monitor    Return in 3 weeks        Subjective:         Patient's cardiac risk factors are: {Causes; risk factors heart failure:00204575}    Past Med, Surg, Fam, Soc Hx and Allergies reviewed and updated as indicated      Review of Systems    Objective:   Physical Exam     Diagnostic Tests   ECG:   No results for input(s): WBC, HGB, HCT, PLT in the last 72 hours. No results for input(s): NA, K, CL, CO2, GLU, BUN, CREA, CA, MG, PHOS, ALB, ALT, INR in the last 72 hours. Invalid input(s): TBIL, SGOT,  BNP    No results for input(s): CPK, CKMB in the last 72 hours.     Invalid input(s): TROIQ  Last Lipid:  No results found for: CHOL, CHOLX, CHLST, CHOLV, HDL, HDLC, LDL, LDLC, TGLX, TRIGL

## 2023-04-24 NOTE — PROGRESS NOTES
Assessment/Plan:       ICD-10-CM    1.  Palpitations  R00.2 Cardiac holter monitor (<= 48 hours)               Subjective:         Patient's cardiac risk factors are: {Causes; risk factors heart failure:02992251}    Past Med, Surg, Fam, Soc Hx and Allergies reviewed and updated as indicated      Review of Systems    Objective:   Physical Exam     12 lead ECG: ***

## 2023-06-28 DIAGNOSIS — R00.2 PALPITATIONS: ICD-10-CM
